# Patient Record
Sex: FEMALE | Race: WHITE | NOT HISPANIC OR LATINO
[De-identification: names, ages, dates, MRNs, and addresses within clinical notes are randomized per-mention and may not be internally consistent; named-entity substitution may affect disease eponyms.]

---

## 2018-05-01 PROBLEM — Z00.00 ENCOUNTER FOR PREVENTIVE HEALTH EXAMINATION: Status: ACTIVE | Noted: 2018-05-01

## 2018-05-21 ENCOUNTER — RECORD ABSTRACTING (OUTPATIENT)
Age: 58
End: 2018-05-21

## 2018-05-22 ENCOUNTER — NON-APPOINTMENT (OUTPATIENT)
Age: 58
End: 2018-05-22

## 2018-05-22 ENCOUNTER — APPOINTMENT (OUTPATIENT)
Dept: CARDIOLOGY | Facility: CLINIC | Age: 58
End: 2018-05-22
Payer: COMMERCIAL

## 2018-05-22 VITALS
HEART RATE: 76 BPM | HEIGHT: 67 IN | OXYGEN SATURATION: 97 % | BODY MASS INDEX: 31.39 KG/M2 | DIASTOLIC BLOOD PRESSURE: 70 MMHG | SYSTOLIC BLOOD PRESSURE: 106 MMHG | WEIGHT: 200 LBS

## 2018-05-22 DIAGNOSIS — Z87.898 PERSONAL HISTORY OF OTHER SPECIFIED CONDITIONS: ICD-10-CM

## 2018-05-22 DIAGNOSIS — Z87.19 PERSONAL HISTORY OF OTHER DISEASES OF THE DIGESTIVE SYSTEM: ICD-10-CM

## 2018-05-22 DIAGNOSIS — F43.9 REACTION TO SEVERE STRESS, UNSPECIFIED: ICD-10-CM

## 2018-05-22 DIAGNOSIS — J30.2 OTHER SEASONAL ALLERGIC RHINITIS: ICD-10-CM

## 2018-05-22 DIAGNOSIS — Z80.7 FAMILY HISTORY OF OTHER MALIGNANT NEOPLASMS OF LYMPHOID, HEMATOPOIETIC AND RELATED TISSUES: ICD-10-CM

## 2018-05-22 DIAGNOSIS — Z80.42 FAMILY HISTORY OF MALIGNANT NEOPLASM OF PROSTATE: ICD-10-CM

## 2018-05-22 DIAGNOSIS — Z91.89 OTHER SPECIFIED PERSONAL RISK FACTORS, NOT ELSEWHERE CLASSIFIED: ICD-10-CM

## 2018-05-22 DIAGNOSIS — Z82.49 FAMILY HISTORY OF ISCHEMIC HEART DISEASE AND OTHER DISEASES OF THE CIRCULATORY SYSTEM: ICD-10-CM

## 2018-05-22 DIAGNOSIS — Z87.891 PERSONAL HISTORY OF NICOTINE DEPENDENCE: ICD-10-CM

## 2018-05-22 DIAGNOSIS — Z78.9 OTHER SPECIFIED HEALTH STATUS: ICD-10-CM

## 2018-05-22 PROCEDURE — 99215 OFFICE O/P EST HI 40 MIN: CPT

## 2018-05-22 PROCEDURE — 93000 ELECTROCARDIOGRAM COMPLETE: CPT

## 2018-05-22 RX ORDER — LEVOTHYROXINE SODIUM 150 UG/1
150 TABLET ORAL DAILY
Refills: 0 | Status: ACTIVE | COMMUNITY

## 2018-07-02 ENCOUNTER — APPOINTMENT (OUTPATIENT)
Dept: CARDIOLOGY | Facility: CLINIC | Age: 58
End: 2018-07-02

## 2018-07-23 PROBLEM — J30.2 SEASONAL ALLERGIES: Status: ACTIVE | Noted: 2018-05-22

## 2018-08-06 ENCOUNTER — APPOINTMENT (OUTPATIENT)
Dept: CARDIOLOGY | Facility: CLINIC | Age: 58
End: 2018-08-06
Payer: COMMERCIAL

## 2018-08-06 PROCEDURE — 93306 TTE W/DOPPLER COMPLETE: CPT

## 2018-09-24 ENCOUNTER — APPOINTMENT (OUTPATIENT)
Dept: CARDIOLOGY | Facility: CLINIC | Age: 58
End: 2018-09-24

## 2019-01-15 ENCOUNTER — APPOINTMENT (OUTPATIENT)
Dept: CARDIOLOGY | Facility: CLINIC | Age: 59
End: 2019-01-15
Payer: COMMERCIAL

## 2019-01-15 PROCEDURE — 93015 CV STRESS TEST SUPVJ I&R: CPT

## 2019-01-21 ENCOUNTER — APPOINTMENT (OUTPATIENT)
Dept: CARDIOLOGY | Facility: CLINIC | Age: 59
End: 2019-01-21
Payer: COMMERCIAL

## 2019-01-21 PROCEDURE — 93351 STRESS TTE COMPLETE: CPT

## 2019-01-22 PROCEDURE — 93224 XTRNL ECG REC UP TO 48 HRS: CPT

## 2019-02-05 ENCOUNTER — APPOINTMENT (OUTPATIENT)
Dept: CARDIOLOGY | Facility: CLINIC | Age: 59
End: 2019-02-05
Payer: COMMERCIAL

## 2019-02-05 VITALS
SYSTOLIC BLOOD PRESSURE: 118 MMHG | BODY MASS INDEX: 31.08 KG/M2 | WEIGHT: 198 LBS | DIASTOLIC BLOOD PRESSURE: 60 MMHG | HEART RATE: 74 BPM | HEIGHT: 67 IN

## 2019-02-05 PROCEDURE — 99214 OFFICE O/P EST MOD 30 MIN: CPT

## 2019-02-05 NOTE — ASSESSMENT
[FreeTextEntry1] : Reviewed today\par - Stress echocardiogram January 2019: Exercise 6:09 minutes on Prabhjot protocol. No angina. No ischemia by stress echocardiogram. Rare PVCs. PASP increased to 26\par - Holter monitor January 2019: Average heart rate 79. Rare PACs. Atrial tachycardia of 5 beat. Multifocal rare PVCs\par - Echocardiogram January 2019\par \par \par

## 2019-02-05 NOTE — HISTORY OF PRESENT ILLNESS
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 She does have history of Hypothyroidism, overweight, anxiety. \par \par Hx of of anxiety and panic in November 2014. 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 She has resumed smoking and eating more fast food. \par \par She complains of palpitations, usually at nighttime when she is trying to rest They are not triggered with exertion.  No associated dizziness. She feels they are secondary to sress. Beta blockers have helped her palpitations - Which tend to occur randomly and infrequently. She is not taking them \par \par She does not have any past history of SVT or atrial fibrillation or other arrhythmias. However, on event recorder, she was noted to have SVT lasting about 10 seconds on 6/9/15 at 5:12 PM.

## 2019-02-05 NOTE — DISCUSSION/SUMMARY
[FreeTextEntry1] : - The patient was reminded regarding the risks of continued smoking and benefits of cessation. She says that she is able to stop her smoking without need for pharmacotherapy.\par - Dietary modification for reduction of fast foods and total calories would be helpful for weight loss\par - She states that she had fasting lipid profile recently and it was adequate. As a nurse, she is aware of LDL, total cholesterol and triglyceride targets.\par - Seasonal allergies:  on Singulair\par \par Discussed findings of stress echocardiogram, echocardiogram and Holter recording. ETT probably had false-positive EKG changes. Accuracy of stress echo was discussed\par \par \par Sections of this transcription were entered using Voice Recognition Software. There may be an error with phonetically similar sounding words. Please call me if there are any questions re the content.\par \par \par Sincerely,\par Alex Rodriguez MD, FACC, FASE \par \par \par Sections of this transcription were entered using Voice Recognition Software. There may be an error with phonetically similar sounding words. Please call me if there are any questions re the content.\par \par

## 2019-02-05 NOTE — PHYSICAL EXAM
[General Appearance - Well Developed] : well developed [Normal Appearance] : normal appearance [Well Groomed] : well groomed [General Appearance - Well Nourished] : well nourished [No Deformities] : no deformities [General Appearance - In No Acute Distress] : no acute distress [Normal Conjunctiva] : the conjunctiva exhibited no abnormalities [Eyelids - No Xanthelasma] : the eyelids demonstrated no xanthelasmas [Normal Oral Mucosa] : normal oral mucosa [No Oral Pallor] : no oral pallor [No Oral Cyanosis] : no oral cyanosis [Respiration, Rhythm And Depth] : normal respiratory rhythm and effort [Exaggerated Use Of Accessory Muscles For Inspiration] : no accessory muscle use [Auscultation Breath Sounds / Voice Sounds] : lungs were clear to auscultation bilaterally [Heart Rate And Rhythm] : heart rate and rhythm were normal [Heart Sounds] : normal S1 and S2 [Murmurs] : no murmurs present [Edema] : no peripheral edema present [Abdomen Soft] : soft [Abdomen Tenderness] : non-tender [Abnormal Walk] : normal gait [Gait - Sufficient For Exercise Testing] : the gait was sufficient for exercise testing [Nail Clubbing] : no clubbing of the fingernails [Cyanosis, Localized] : no localized cyanosis [Skin Color & Pigmentation] : normal skin color and pigmentation [Skin Turgor] : normal skin turgor [] : no rash [Oriented To Time, Place, And Person] : oriented to person, place, and time [Affect] : the affect was normal [Mood] : the mood was normal [No Anxiety] : not feeling anxious [FreeTextEntry1] : No Carotid bruit. No JVD

## 2019-12-02 ENCOUNTER — RESULT CHARGE (OUTPATIENT)
Age: 59
End: 2019-12-02

## 2019-12-03 ENCOUNTER — APPOINTMENT (OUTPATIENT)
Dept: CARDIOLOGY | Facility: CLINIC | Age: 59
End: 2019-12-03
Payer: COMMERCIAL

## 2019-12-03 ENCOUNTER — NON-APPOINTMENT (OUTPATIENT)
Age: 59
End: 2019-12-03

## 2019-12-03 VITALS
DIASTOLIC BLOOD PRESSURE: 60 MMHG | SYSTOLIC BLOOD PRESSURE: 100 MMHG | WEIGHT: 199 LBS | HEIGHT: 67 IN | OXYGEN SATURATION: 95 % | BODY MASS INDEX: 31.23 KG/M2 | HEART RATE: 83 BPM

## 2019-12-03 DIAGNOSIS — F41.9 ANXIETY DISORDER, UNSPECIFIED: ICD-10-CM

## 2019-12-03 DIAGNOSIS — F17.200 NICOTINE DEPENDENCE, UNSPECIFIED, UNCOMPLICATED: ICD-10-CM

## 2019-12-03 DIAGNOSIS — E03.9 HYPOTHYROIDISM, UNSPECIFIED: ICD-10-CM

## 2019-12-03 DIAGNOSIS — R94.39 ABNORMAL RESULT OF OTHER CARDIOVASCULAR FUNCTION STUDY: ICD-10-CM

## 2019-12-03 DIAGNOSIS — R00.8 OTHER ABNORMALITIES OF HEART BEAT: ICD-10-CM

## 2019-12-03 DIAGNOSIS — E06.3 AUTOIMMUNE THYROIDITIS: ICD-10-CM

## 2019-12-03 PROCEDURE — 99214 OFFICE O/P EST MOD 30 MIN: CPT

## 2019-12-03 RX ORDER — MONTELUKAST 10 MG/1
10 TABLET, FILM COATED ORAL DAILY
Qty: 30 | Refills: 0 | Status: DISCONTINUED | COMMUNITY
Start: 2018-05-22 | End: 2019-12-03

## 2019-12-03 NOTE — DISCUSSION/SUMMARY
[FreeTextEntry1] : - The patient was reminded regarding the benefits of smoking cessation. \par -Is recovering from sinusitis.  She is on Zithromax and Medrol pack.\par - Dietary modification for reduction of fast foods and total calories would be helpful for weight loss\par - Lipid profile was discussed.   in June 2019.  Target LDL less than 100.  Dietary changes were again discussed.\par - Seasonal allergies:  on Singulair\par \par Discussed findings of stress echocardiogram, echocardiogram and Holter recording. ETT probably had false-positive EKG changes. Accuracy of stress echo was discussed\par \par \par Sincerely,\par Alex Rodriguez MD, FACC, NITHYA \par \par

## 2019-12-03 NOTE — PHYSICAL EXAM
[General Appearance - Well Developed] : well developed [Normal Appearance] : normal appearance [Well Groomed] : well groomed [No Deformities] : no deformities [General Appearance - Well Nourished] : well nourished [General Appearance - In No Acute Distress] : no acute distress [Normal Conjunctiva] : the conjunctiva exhibited no abnormalities [Eyelids - No Xanthelasma] : the eyelids demonstrated no xanthelasmas [Normal Oral Mucosa] : normal oral mucosa [No Oral Pallor] : no oral pallor [No Oral Cyanosis] : no oral cyanosis [FreeTextEntry1] : No Carotid bruit. No JVD  [Respiration, Rhythm And Depth] : normal respiratory rhythm and effort [Exaggerated Use Of Accessory Muscles For Inspiration] : no accessory muscle use [Auscultation Breath Sounds / Voice Sounds] : lungs were clear to auscultation bilaterally [Heart Rate And Rhythm] : heart rate and rhythm were normal [Murmurs] : no murmurs present [Heart Sounds] : normal S1 and S2 [Edema] : no peripheral edema present [Abdomen Soft] : soft [Abdomen Tenderness] : non-tender [Abnormal Walk] : normal gait [Gait - Sufficient For Exercise Testing] : the gait was sufficient for exercise testing [Nail Clubbing] : no clubbing of the fingernails [Cyanosis, Localized] : no localized cyanosis [Skin Color & Pigmentation] : normal skin color and pigmentation [Skin Turgor] : normal skin turgor [] : no rash [Oriented To Time, Place, And Person] : oriented to person, place, and time [Affect] : the affect was normal [No Anxiety] : not feeling anxious [Mood] : the mood was normal

## 2019-12-03 NOTE — ASSESSMENT
[FreeTextEntry1] : Reviewed today\par - Stress echocardiogram January 2019: Exercise 6:09 minutes on Prabhjot protocol. No angina. No ischemia by stress echocardiogram. Rare PVCs. PASP increased to 26\par - Holter monitor January 2019: Average heart rate 79. Rare PACs. Atrial tachycardia of 5 beat. Multifocal rare PVCs\par - Echocardiogram January 2019\par -EKG 12/3/2019: Sinus rhythm, unremarkable\par \par \par

## 2019-12-03 NOTE — HISTORY OF PRESENT ILLNESS
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 She does have history of Hypothyroidism, overweight, anxiety.  She just quit smoking\par \par Hx of of anxiety and panic in the past, no significant episodes in the past 6 months.  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  She still does not eat healthy by her own admission.  She has not been able to lose weight\par \par History of palpitations, usually at nighttime.  They are not triggered with exertion.  No associated dizziness. She feels they are secondary to stress. Beta blockers have helped her palpitations - Which tend to occur randomly and infrequently. She is not taking them \par \par She does not have any past history of SVT or atrial fibrillation or other arrhythmias. However, on event recorder, she was noted to have SVT lasting about 10 seconds on 6/9/15 at 5:12 PM.

## 2020-12-18 ENCOUNTER — TRANSCRIPTION ENCOUNTER (OUTPATIENT)
Age: 60
End: 2020-12-18

## 2022-04-08 ENCOUNTER — NON-APPOINTMENT (OUTPATIENT)
Age: 62
End: 2022-04-08

## 2022-04-08 ENCOUNTER — APPOINTMENT (OUTPATIENT)
Dept: CARDIOLOGY | Facility: CLINIC | Age: 62
End: 2022-04-08
Payer: COMMERCIAL

## 2022-04-08 VITALS
BODY MASS INDEX: 30.86 KG/M2 | DIASTOLIC BLOOD PRESSURE: 60 MMHG | HEART RATE: 77 BPM | SYSTOLIC BLOOD PRESSURE: 116 MMHG | HEIGHT: 66 IN | WEIGHT: 192 LBS | OXYGEN SATURATION: 98 %

## 2022-04-08 PROCEDURE — 93000 ELECTROCARDIOGRAM COMPLETE: CPT

## 2022-04-08 PROCEDURE — 99214 OFFICE O/P EST MOD 30 MIN: CPT | Mod: 25

## 2022-04-08 RX ORDER — OMEPRAZOLE 40 MG/1
40 CAPSULE, DELAYED RELEASE ORAL DAILY
Refills: 0 | Status: ACTIVE | COMMUNITY

## 2022-04-08 RX ORDER — METOPROLOL SUCCINATE 25 MG/1
25 TABLET, EXTENDED RELEASE ORAL DAILY
Qty: 45 | Refills: 3 | Status: ACTIVE | COMMUNITY
Start: 2022-04-08 | End: 1900-01-01

## 2022-04-08 NOTE — DISCUSSION/SUMMARY
[FreeTextEntry1] : 1. Palpitations/PACs: appear post-prandial. Patient saw GI in the past. Had GERD and placed on PPI. Patient now off PPI. GERD is back. Consider GI follow up. Recommend Toprol XL 12.5mg daily. Check echocardiogram and stress echocardiogram. Patient should have imaging as patient with abnormal ETT in the past. \par \par Patient states she just had labs with PCP and it was within normal limits. \par \par Follow up in 2 months.

## 2022-04-08 NOTE — PHYSICAL EXAM
[Normal] : moves all extremities, no focal deficits, normal speech [de-identified] : No carotid bruits auscultated bilaterally

## 2022-04-08 NOTE — HISTORY OF PRESENT ILLNESS
[FreeTextEntry1] : 61 year old female with history of palpitations presents with palpitations and PACs. She has noted them after eating usually over the past 2-3 weeks. 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 She hooks herself up to an ECG and can see the PACs occurring. They last about 30 minutes post-prandial and resolve on its own.\par \par Still smoking 1/2 pack per day.\par \par There is no history of MI, CVA, CHF, or previous coronary intervention.\par

## 2022-04-08 NOTE — REVIEW OF SYSTEMS
[Anxiety] : anxiety [Under Stress] : under stress [Negative] : Neurological [FreeTextEntry5] : see HPI [FreeTextEntry7] : see HPI

## 2022-04-12 ENCOUNTER — APPOINTMENT (OUTPATIENT)
Dept: CARDIOLOGY | Facility: CLINIC | Age: 62
End: 2022-04-12

## 2022-04-18 ENCOUNTER — APPOINTMENT (OUTPATIENT)
Dept: CARDIOLOGY | Facility: CLINIC | Age: 62
End: 2022-04-18
Payer: COMMERCIAL

## 2022-04-18 PROCEDURE — 93242 EXT ECG>48HR<7D RECORDING: CPT

## 2022-04-30 ENCOUNTER — APPOINTMENT (OUTPATIENT)
Dept: CARDIOLOGY | Facility: CLINIC | Age: 62
End: 2022-04-30
Payer: COMMERCIAL

## 2022-04-30 PROCEDURE — 93306 TTE W/DOPPLER COMPLETE: CPT

## 2022-05-10 ENCOUNTER — NON-APPOINTMENT (OUTPATIENT)
Age: 62
End: 2022-05-10

## 2022-05-11 ENCOUNTER — NON-APPOINTMENT (OUTPATIENT)
Age: 62
End: 2022-05-11

## 2022-06-29 ENCOUNTER — APPOINTMENT (OUTPATIENT)
Dept: CARDIOLOGY | Facility: CLINIC | Age: 62
End: 2022-06-29

## 2022-06-29 PROCEDURE — 93320 DOPPLER ECHO COMPLETE: CPT

## 2022-06-29 PROCEDURE — 93351 STRESS TTE COMPLETE: CPT

## 2022-07-01 ENCOUNTER — APPOINTMENT (OUTPATIENT)
Dept: CARDIOLOGY | Facility: CLINIC | Age: 62
End: 2022-07-01

## 2022-07-01 VITALS
OXYGEN SATURATION: 97 % | SYSTOLIC BLOOD PRESSURE: 102 MMHG | HEART RATE: 66 BPM | BODY MASS INDEX: 31.66 KG/M2 | HEIGHT: 66 IN | DIASTOLIC BLOOD PRESSURE: 62 MMHG | WEIGHT: 197 LBS | TEMPERATURE: 97.3 F

## 2022-07-01 DIAGNOSIS — Z87.2 PERSONAL HISTORY OF DISEASES OF THE SKIN AND SUBCUTANEOUS TISSUE: ICD-10-CM

## 2022-07-01 DIAGNOSIS — I49.1 ATRIAL PREMATURE DEPOLARIZATION: ICD-10-CM

## 2022-07-01 DIAGNOSIS — R00.2 PALPITATIONS: ICD-10-CM

## 2022-07-01 DIAGNOSIS — Z87.39 PERSONAL HISTORY OF OTHER DISEASES OF THE MUSCULOSKELETAL SYSTEM AND CONNECTIVE TISSUE: ICD-10-CM

## 2022-07-01 PROCEDURE — 99214 OFFICE O/P EST MOD 30 MIN: CPT

## 2022-07-01 RX ORDER — ALPRAZOLAM 0.25 MG/1
0.25 TABLET ORAL
Qty: 30 | Refills: 0 | Status: ACTIVE | COMMUNITY
Start: 2022-04-06

## 2022-07-01 NOTE — CARDIOLOGY SUMMARY
[de-identified] : 4/8/2022, NSR, poor R wave progression. [de-identified] : 4/18/2022, 3 Day Zio, NSR with rare ventricular and atrial ectopy. [de-identified] : 6/29/2022, Stress Echocardiogram: Exercised for 5 minutes and 30 seconds utilizing standard Prabhjot Protocol. Ischemic ECG changes, however, normal LV augmentation with stress.  [de-identified] : 4/30/2022, LV EF 55-60%, trace MR, normal LV diastolic function, trace TR with estimated PASP of 16mmHg.

## 2022-07-01 NOTE — DISCUSSION/SUMMARY
[FreeTextEntry1] : 1. Palpitations/PACs: Since seeing me last patient reduced her stress/anxiety levels. She also restarted her PPI and drastically reduces her caffeine intake. She is taking Toprol XL 12.5mg daily and her palpitations have drastically improved. Her testing, which included stress echocardiogram, TTE, and 3 Day Zio were largely unremarkable. \par \par Patient states she just had labs with PCP and it was within normal limits. \par \par Follow up in 12 months.

## 2022-07-01 NOTE — HISTORY OF PRESENT ILLNESS
[FreeTextEntry1] : Historical Perspective:\par 61 year old female with history of palpitations presents with palpitations and PACs. She has noted them after eating usually over the past 2-3 weeks. 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 They last about 30 minutes post-prandial and resolve on its own.\par \par Still smoking 1/2 pack per day.\par \par There is no history of MI, CVA, CHF, or previous coronary intervention.\par \par Current Health Status:\par Since seeing me last patient reduced her stress/anxiety levels. She also restarted her PPI and drastically reduces her caffeine intake. She is taking Toprol XL 12.5mg daily and her palpitations have drastically improved. There is no chest pain, SOB, or palpitations. \par \par \par Following with rheumatology. Diagnosed with psoriatic arthritis/RA. Will be starting hydroxychloroquine soon. \par \par \par

## 2022-07-01 NOTE — PHYSICAL EXAM
[Normal] : moves all extremities, no focal deficits, normal speech [de-identified] : No carotid bruits auscultated bilaterally

## 2023-06-28 ENCOUNTER — APPOINTMENT (OUTPATIENT)
Dept: CARDIOLOGY | Facility: CLINIC | Age: 63
End: 2023-06-28